# Patient Record
Sex: MALE | Race: WHITE | Employment: OTHER | URBAN - METROPOLITAN AREA
[De-identification: names, ages, dates, MRNs, and addresses within clinical notes are randomized per-mention and may not be internally consistent; named-entity substitution may affect disease eponyms.]

---

## 2017-04-02 ENCOUNTER — HOSPITAL ENCOUNTER (OUTPATIENT)
Age: 57
Discharge: HOME | End: 2017-04-02
Attending: FAMILY MEDICINE
Payer: COMMERCIAL

## 2017-04-02 VITALS
SYSTOLIC BLOOD PRESSURE: 133 MMHG | WEIGHT: 175 LBS | OXYGEN SATURATION: 98 % | BODY MASS INDEX: 23 KG/M2 | TEMPERATURE: 98 F | DIASTOLIC BLOOD PRESSURE: 91 MMHG | RESPIRATION RATE: 16 BRPM | HEART RATE: 67 BPM

## 2017-04-02 DIAGNOSIS — J01.00 ACUTE MAXILLARY SINUSITIS, RECURRENCE NOT SPECIFIED: Primary | ICD-10-CM

## 2017-04-02 PROCEDURE — 99214 OFFICE O/P EST MOD 30 MIN: CPT

## 2017-04-02 PROCEDURE — 99213 OFFICE O/P EST LOW 20 MIN: CPT

## 2017-04-02 RX ORDER — AZITHROMYCIN 250 MG/1
TABLET, FILM COATED ORAL
Qty: 1 PACKAGE | Refills: 0 | Status: SHIPPED | OUTPATIENT
Start: 2017-04-02 | End: 2017-04-07

## 2017-04-02 RX ORDER — BENZONATATE 100 MG/1
100 CAPSULE ORAL 3 TIMES DAILY PRN
Qty: 20 CAPSULE | Refills: 0 | Status: SHIPPED | OUTPATIENT
Start: 2017-04-02 | End: 2018-09-07

## 2017-04-02 RX ORDER — FLUTICASONE PROPIONATE 50 MCG
2 SPRAY, SUSPENSION (ML) NASAL DAILY
Qty: 16 G | Refills: 0 | Status: SHIPPED | OUTPATIENT
Start: 2017-04-02 | End: 2017-05-02

## 2017-04-02 NOTE — ED INITIAL ASSESSMENT (HPI)
Patient c/o non productive cough for approximately 1 week. Nasal congestion, post nasal drip and pain across forehead for 3 days.

## 2017-04-02 NOTE — ED PROVIDER NOTES
Patient Seen in: [address] [facility]    History   Patient presents with:  Sinusitis    Stated Complaint: Sinus Pressure and HA    HPI    Patient is a 50-year-old male.   Coming in today with one-week history of nasal congestion, cough, postnas numbness. Positive for stated complaint: Sinus Pressure and HA  Other systems are as noted in HPI. Constitutional and vital signs reviewed. All other systems reviewed and negative except as noted above.     PSFH elements reviewed from today and Impression:  Acute maxillary sinusitis, recurrence not specified  (primary encounter diagnosis)    Disposition:  Discharge    Follow-up:  Primary MD    In 3 days  If symptoms worsen      Medications Prescribed:  Discharge Medication List as of 4/2/2017  3:

## 2018-09-07 ENCOUNTER — HOSPITAL ENCOUNTER (OUTPATIENT)
Age: 58
Discharge: HOME | End: 2018-09-07
Payer: COMMERCIAL

## 2018-09-07 VITALS
TEMPERATURE: 98 F | OXYGEN SATURATION: 99 % | SYSTOLIC BLOOD PRESSURE: 129 MMHG | RESPIRATION RATE: 16 BRPM | HEART RATE: 68 BPM | DIASTOLIC BLOOD PRESSURE: 83 MMHG

## 2018-09-07 DIAGNOSIS — J01.00 ACUTE NON-RECURRENT MAXILLARY SINUSITIS: Primary | ICD-10-CM

## 2018-09-07 PROCEDURE — 99214 OFFICE O/P EST MOD 30 MIN: CPT

## 2018-09-07 PROCEDURE — 99213 OFFICE O/P EST LOW 20 MIN: CPT

## 2018-09-07 RX ORDER — DOXYCYCLINE HYCLATE 100 MG/1
100 CAPSULE ORAL 2 TIMES DAILY
Qty: 14 CAPSULE | Refills: 0 | Status: SHIPPED | OUTPATIENT
Start: 2018-09-07 | End: 2018-09-14

## 2018-09-07 NOTE — ED PROVIDER NOTES
Patient Seen in: [address] [facility]    History   Patient presents with:  Sinusitis    Stated Complaint: COUGH    39-year-old male who presents to the immediate care with complaints of sinus congestion/pressure/pain for the past 3 weeks.   Chante Holden air)    Current:/83   Pulse 68   Temp 98.2 °F (36.8 °C) (Temporal)   Resp 16   SpO2 99%         Physical Exam  Constitutional: He is oriented to person, place, and time. Vital signs are normal. He appears well-developed and well-nourished.  He is coop patient and/or family expressed clear understanding of these instructions and agrees to the following plan provided.   The patient and/or family was also given written discharge instructions including information regarding today's visit and indications prom

## 2018-09-07 NOTE — ED INITIAL ASSESSMENT (HPI)
Pt states has had sinus drainage and congestion x 3 weeks, taking otc meds and now feeling worse. States now has started coughing, no pain or chest congestion.

## 2018-11-13 ENCOUNTER — HOSPITAL ENCOUNTER (OUTPATIENT)
Age: 58
Discharge: HOME | End: 2018-11-13
Attending: FAMILY MEDICINE
Payer: COMMERCIAL

## 2018-11-13 ENCOUNTER — APPOINTMENT (OUTPATIENT)
Dept: CT IMAGING | Age: 58
End: 2018-11-13
Attending: FAMILY MEDICINE
Payer: COMMERCIAL

## 2018-11-13 VITALS
BODY MASS INDEX: 23.19 KG/M2 | RESPIRATION RATE: 20 BRPM | HEIGHT: 73 IN | OXYGEN SATURATION: 99 % | HEART RATE: 67 BPM | TEMPERATURE: 98 F | WEIGHT: 175 LBS | DIASTOLIC BLOOD PRESSURE: 82 MMHG | SYSTOLIC BLOOD PRESSURE: 142 MMHG

## 2018-11-13 DIAGNOSIS — R10.9 LEFT SIDED ABDOMINAL PAIN: Primary | ICD-10-CM

## 2018-11-13 PROCEDURE — 80047 BASIC METABLC PNL IONIZED CA: CPT

## 2018-11-13 PROCEDURE — 81002 URINALYSIS NONAUTO W/O SCOPE: CPT | Performed by: FAMILY MEDICINE

## 2018-11-13 PROCEDURE — 74177 CT ABD & PELVIS W/CONTRAST: CPT | Performed by: FAMILY MEDICINE

## 2018-11-13 PROCEDURE — 99214 OFFICE O/P EST MOD 30 MIN: CPT

## 2018-11-13 PROCEDURE — 85025 COMPLETE CBC W/AUTO DIFF WBC: CPT | Performed by: FAMILY MEDICINE

## 2018-11-13 PROCEDURE — 36415 COLL VENOUS BLD VENIPUNCTURE: CPT

## 2018-11-13 PROCEDURE — 99215 OFFICE O/P EST HI 40 MIN: CPT

## 2018-11-13 RX ORDER — POTASSIUM CHLORIDE 20 MEQ/1
40 TABLET, EXTENDED RELEASE ORAL ONCE
Status: COMPLETED | OUTPATIENT
Start: 2018-11-13 | End: 2018-11-13

## 2018-11-13 NOTE — ED INITIAL ASSESSMENT (HPI)
abd pain left mid that started on Sunday, sharp pain comes and goes, does at times radiate upwards to under rib cage, no nausea, vomiting, or diarrhea, normal bowel movement this am, and was a soft bowel movement.

## 2018-11-13 NOTE — ED PROVIDER NOTES
Patient Seen in: [address] [facility]    History   Patient presents with:  Abdominal Pain    Stated Complaint: SHARP PAIN ON LEFT SIDE OF AB    HPI    **50-year-old male presents to the immediate care today with chief complaints of left-si 97.4 °F (36.3 °C)   Temp src Temporal   SpO2 100 %   O2 Device None (Room air)       Current:/82   Pulse 67   Temp 98 °F (36.7 °C)   Resp 20   Ht 185.4 cm (6' 1\")   Wt 79.4 kg   SpO2 99%   BMI 23.09 kg/m²         Physical Exam    GENERAL: well devel symphysis with non-ionic intravenous contrast material. Post contrast coronal MPR imaging was performed. Dose reduction techniques were used.  Dose information is transmitted to the [facility] [employer] Asif. Suzi Villagran 35 ([address] (primary encounter diagnosis)    Disposition:  Discharge  11/13/2018  2:06 pm    Follow-up:  Lennox Novak  [address] [place] [address] E Dave Morales  [phone]    In 1 week  For re-check        Medications Prescribed:  There are no discharge medi

## (undated) NOTE — ED AVS SNAPSHOT
THE [facility] in [facility] [address] [address]    Phone:  [phone]    Fax:  [address]   MRN: [id number]    Department:  THE [facility] in [place]   Date of Visit:  4/2/2017           Diagnose [address] [facility], [address]  [phone] [address]rs[address]harfvenusrjess [address]  [address]  [phone] [address] [place] [address] [address] [facility] [address]   [phone]       To Check ER Wait Times:  MICAH reading, you will be contacted. Please make sure we have your correct phone number before you leave. After you leave, you should follow the attached instructions. I have read and understand the instructions given to me by my caregivers.         24-Hour Sign up for [employer], your secure online medical record. [employer] will allow you to access patient instructions from your recent visit,  view other health information, and more. To sign up or find more information, go to [email or web address] and cl